# Patient Record
Sex: MALE | Race: BLACK OR AFRICAN AMERICAN | NOT HISPANIC OR LATINO | Employment: FULL TIME | URBAN - METROPOLITAN AREA
[De-identification: names, ages, dates, MRNs, and addresses within clinical notes are randomized per-mention and may not be internally consistent; named-entity substitution may affect disease eponyms.]

---

## 2021-11-22 ENCOUNTER — HOSPITAL ENCOUNTER (EMERGENCY)
Facility: OTHER | Age: 36
Discharge: HOME | End: 2021-11-22
Attending: EMERGENCY MEDICINE
Payer: OTHER MISCELLANEOUS

## 2021-11-22 VITALS
RESPIRATION RATE: 16 BRPM | OXYGEN SATURATION: 98 % | WEIGHT: 287 LBS | SYSTOLIC BLOOD PRESSURE: 121 MMHG | HEIGHT: 77 IN | BODY MASS INDEX: 33.89 KG/M2 | HEART RATE: 92 BPM | DIASTOLIC BLOOD PRESSURE: 85 MMHG | TEMPERATURE: 99 F

## 2021-11-22 DIAGNOSIS — M25.312 SHOULDER INSTABILITY, LEFT: ICD-10-CM

## 2021-11-22 DIAGNOSIS — T14.90XA TRAUMA: ICD-10-CM

## 2021-11-22 DIAGNOSIS — M25.512 ACUTE PAIN OF LEFT SHOULDER: Primary | ICD-10-CM

## 2021-11-22 DIAGNOSIS — R52 PAIN: ICD-10-CM

## 2021-11-22 PROCEDURE — 99284 EMERGENCY DEPT VISIT MOD MDM: CPT | Mod: 25

## 2021-11-22 PROCEDURE — 25000003 PHARM REV CODE 250: Performed by: EMERGENCY MEDICINE

## 2021-11-22 RX ORDER — AMLODIPINE BESYLATE 5 MG/1
5 TABLET ORAL DAILY
COMMUNITY
Start: 2021-11-03

## 2021-11-22 RX ORDER — DICLOFENAC SODIUM 10 MG/G
2 GEL TOPICAL 3 TIMES DAILY PRN
Qty: 100 G | Refills: 0 | Status: SHIPPED | OUTPATIENT
Start: 2021-11-22

## 2021-11-22 RX ORDER — NAPROXEN 500 MG/1
500 TABLET ORAL
Status: COMPLETED | OUTPATIENT
Start: 2021-11-22 | End: 2021-11-22

## 2021-11-22 RX ORDER — NAPROXEN 500 MG/1
500 TABLET ORAL 2 TIMES DAILY PRN
Qty: 60 TABLET | Refills: 0 | Status: SHIPPED | OUTPATIENT
Start: 2021-11-22

## 2021-11-22 RX ORDER — LIDOCAINE 50 MG/G
PATCH TOPICAL
Qty: 30 PATCH | Refills: 0 | Status: SHIPPED | OUTPATIENT
Start: 2021-11-22

## 2021-11-22 RX ADMIN — NAPROXEN 500 MG: 500 TABLET ORAL at 11:11

## 2021-11-23 ENCOUNTER — TELEPHONE (OUTPATIENT)
Dept: URGENT CARE | Facility: CLINIC | Age: 36
End: 2021-11-23
Payer: OTHER MISCELLANEOUS

## 2024-05-26 ENCOUNTER — OFFICE VISIT (OUTPATIENT)
Dept: URGENT CARE | Facility: CLINIC | Age: 39
End: 2024-05-26
Payer: COMMERCIAL

## 2024-05-26 VITALS
HEART RATE: 105 BPM | SYSTOLIC BLOOD PRESSURE: 147 MMHG | OXYGEN SATURATION: 98 % | DIASTOLIC BLOOD PRESSURE: 100 MMHG | TEMPERATURE: 99 F | WEIGHT: 286 LBS | HEIGHT: 77 IN | BODY MASS INDEX: 33.77 KG/M2 | RESPIRATION RATE: 18 BRPM

## 2024-05-26 DIAGNOSIS — H61.23 BILATERAL IMPACTED CERUMEN: Primary | ICD-10-CM

## 2024-05-26 PROCEDURE — 99203 OFFICE O/P NEW LOW 30 MIN: CPT | Mod: S$GLB,,, | Performed by: NURSE PRACTITIONER

## 2024-05-26 NOTE — PROGRESS NOTES
"Subjective:      Patient ID: Aneesh Salinas is a 39 y.o. male.    Vitals:  height is 6' 5" (1.956 m) and weight is 129.7 kg (286 lb). His temperature is 98.5 °F (36.9 °C). His blood pressure is 147/100 (abnormal) and his pulse is 105. His respiration is 18 and oxygen saturation is 98%.     Chief Complaint: Ear Fullness    40yo male pt reports BL ear fullness worsening over the past week.  Reports that he and a friend attempted to remove excess ear wax with a Q-tip, but he thinks that it might have been pushed in further and worsened symptoms.  Reports BL hearing loss.  Denies fever/chills.  Denies n/v or dizziness.  Denies ear pain or discharge.    Ear Fullness   There is pain in both ears. This is a new problem. The current episode started in the past 7 days. There has been no fever. The pain is at a severity of 0/10. Associated symptoms include hearing loss. Pertinent negatives include no abdominal pain, coughing, diarrhea, ear discharge, rhinorrhea or vomiting. He has tried nothing for the symptoms.       Constitution: Negative for chills and fever.   HENT:  Positive for hearing loss. Negative for ear pain, ear discharge, foreign body in ear and tinnitus.    Respiratory:  Negative for cough.    Gastrointestinal:  Negative for abdominal pain, vomiting and diarrhea.      Objective:     Physical Exam   Constitutional: He is oriented to person, place, and time. He appears well-developed. He is cooperative.  Non-toxic appearance. He does not appear ill. No distress.   HENT:   Head: Normocephalic and atraumatic.   Ears:   Right Ear: Hearing, tympanic membrane, external ear and ear canal normal. No no drainage, swelling or tenderness. Tympanic membrane is not erythematous, not retracted and not bulging. No middle ear effusion. impacted cerumen  Left Ear: Hearing, tympanic membrane, external ear and ear canal normal. No no drainage, swelling or tenderness. Tympanic membrane is not erythematous, not retracted and not " bulging.  No middle ear effusion. impacted cerumen  Nose: Nose normal. No mucosal edema, rhinorrhea or nasal deformity. No epistaxis. Right sinus exhibits no maxillary sinus tenderness and no frontal sinus tenderness. Left sinus exhibits no maxillary sinus tenderness and no frontal sinus tenderness.   Mouth/Throat: Uvula is midline, oropharynx is clear and moist and mucous membranes are normal. No trismus in the jaw. Normal dentition. No uvula swelling. No oropharyngeal exudate, posterior oropharyngeal edema or posterior oropharyngeal erythema.   After cerumen removal:  Ear canals clear and pink, no erythema or swelling, BL TMs gray and pearly with appropriate landmarks.      Comments: After cerumen removal:  Ear canals clear and pink, no erythema or swelling, BL TMs gray and pearly with appropriate landmarks.  Eyes: Conjunctivae and lids are normal. No scleral icterus.   Neck: Trachea normal and phonation normal. Neck supple. No edema present. No erythema present. No neck rigidity present.   Cardiovascular: Normal rate, regular rhythm, normal heart sounds and normal pulses.   Pulmonary/Chest: Effort normal and breath sounds normal. No respiratory distress. He has no decreased breath sounds. He has no rhonchi.   Abdominal: Normal appearance.   Musculoskeletal: Normal range of motion.         General: No deformity. Normal range of motion.   Neurological: He is alert and oriented to person, place, and time. He exhibits normal muscle tone. Coordination normal.   Skin: Skin is warm, dry, intact, not diaphoretic and not pale.   Psychiatric: His speech is normal and behavior is normal. Judgment and thought content normal.   Nursing note and vitals reviewed.      Assessment:     1. Bilateral impacted cerumen        Plan:     Provided education on risks of cerumen removal, including possible eardrum perforation, pt verbalized understanding and agreed to procedure.      Provided education on prescribed medications.  Provided  education on return/ER precautions.  Pt verbalized understanding and agreed to plan.      Bilateral impacted cerumen  -     Ear wax removal      Patient Instructions   If your condition worsens or fails to improve, we recommend that you receive another evaluation at the ER immediately contact your PCP to discuss your concerns, or return here.  You must understand that you've received an urgent care treatment only, and that you may be released before all your medical problems are known or treated.  You, the patient, will arrange for follow-up care as instructed.

## 2024-05-26 NOTE — PATIENT INSTRUCTIONS
If your condition worsens or fails to improve, we recommend that you receive another evaluation at the ER immediately contact your PCP to discuss your concerns, or return here.  You must understand that you've received an urgent care treatment only, and that you may be released before all your medical problems are known or treated.  You, the patient, will arrange for follow-up care as instructed.